# Patient Record
Sex: MALE | Race: WHITE | NOT HISPANIC OR LATINO | Employment: FULL TIME | URBAN - METROPOLITAN AREA
[De-identification: names, ages, dates, MRNs, and addresses within clinical notes are randomized per-mention and may not be internally consistent; named-entity substitution may affect disease eponyms.]

---

## 2023-01-10 ENCOUNTER — OFFICE VISIT (OUTPATIENT)
Dept: URGENT CARE | Facility: PHYSICIAN GROUP | Age: 28
End: 2023-01-10
Payer: OTHER GOVERNMENT

## 2023-01-10 VITALS
OXYGEN SATURATION: 95 % | SYSTOLIC BLOOD PRESSURE: 130 MMHG | WEIGHT: 164 LBS | DIASTOLIC BLOOD PRESSURE: 88 MMHG | HEIGHT: 71 IN | TEMPERATURE: 98.7 F | BODY MASS INDEX: 22.96 KG/M2 | HEART RATE: 89 BPM | RESPIRATION RATE: 14 BRPM

## 2023-01-10 DIAGNOSIS — R21 RASH IN ADULT: ICD-10-CM

## 2023-01-10 DIAGNOSIS — B27.90 INFECTIOUS MONONUCLEOSIS WITHOUT COMPLICATION, INFECTIOUS MONONUCLEOSIS DUE TO UNSPECIFIED ORGANISM: ICD-10-CM

## 2023-01-10 LAB
HETEROPH AB SER QL LA: POSITIVE
INT CON NEG: NEGATIVE
INT CON POS: POSITIVE

## 2023-01-10 PROCEDURE — 86308 HETEROPHILE ANTIBODY SCREEN: CPT | Performed by: PHYSICIAN ASSISTANT

## 2023-01-10 PROCEDURE — 99203 OFFICE O/P NEW LOW 30 MIN: CPT | Performed by: PHYSICIAN ASSISTANT

## 2023-01-10 ASSESSMENT — ENCOUNTER SYMPTOMS
DIAPHORESIS: 0
COUGH: 0
DIARRHEA: 0
EYE PAIN: 0
SORE THROAT: 0
CHILLS: 0
CONSTIPATION: 0
SHORTNESS OF BREATH: 0
ABDOMINAL PAIN: 0
DIZZINESS: 0
FEVER: 0
EYE DISCHARGE: 0
WHEEZING: 0
EYE REDNESS: 0
HEADACHES: 0
VOMITING: 0
SINUS PAIN: 0
NAUSEA: 0

## 2023-01-10 NOTE — PROGRESS NOTES
"  Subjective:     Fede Boles  is a 27 y.o. male who presents for Rash (Rash all over body started yesterday )       He presents today with a generalized erythematous maculopapular rash x1 day.  Notes that he is currently taking amoxicillin for dental complaint and has been taking the amoxicillin for 14-maria teresa days; he has stopped his amoxicillin once the onset of the rash did occur.  Rash is pruritic in nature, nonpainful, rash did start on the trunk and moved to the extremities.  No mucosal involvement.  No skin sloughing.  Denies fever/chills/sweats, chest pain, shortness of breath, nausea/vomiting, abdominal pain, diarrhea.  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     Review of Systems   Constitutional:  Negative for chills, diaphoresis, fever and malaise/fatigue.   HENT:  Negative for congestion, ear discharge, sinus pain and sore throat.    Eyes:  Negative for pain, discharge and redness.   Respiratory:  Negative for cough, shortness of breath and wheezing.    Cardiovascular:  Negative for chest pain.   Gastrointestinal:  Negative for abdominal pain, constipation, diarrhea, nausea and vomiting.   Skin:  Positive for itching and rash.   Neurological:  Negative for dizziness and headaches.    Not on File  History reviewed. No pertinent past medical history.     Objective:   /88 (BP Location: Left arm, Patient Position: Sitting, BP Cuff Size: Adult)   Pulse 89   Temp 37.1 °C (98.7 °F) (Temporal)   Resp 14   Ht 1.803 m (5' 11\")   Wt 74.4 kg (164 lb)   SpO2 95%   BMI 22.87 kg/m²   Physical Exam  Vitals and nursing note reviewed.   Constitutional:       General: He is not in acute distress.     Appearance: He is not ill-appearing or toxic-appearing.   HENT:      Head: Normocephalic.      Nose: No rhinorrhea.      Mouth/Throat:      Mouth: Mucous membranes are moist.      Pharynx: No oropharyngeal exudate or posterior " oropharyngeal erythema.      Comments: No oropharyngeal swelling or edema  Eyes:      General: No scleral icterus.     Conjunctiva/sclera: Conjunctivae normal.   Cardiovascular:      Rate and Rhythm: Normal rate and regular rhythm.   Pulmonary:      Effort: Pulmonary effort is normal. No respiratory distress.      Breath sounds: Normal breath sounds. No stridor.   Musculoskeletal:      Cervical back: Neck supple.   Skin:     Comments: Generalized erythematous maculopapular rash present over all aspects of the body.  No mucosal involvement.  Rash is pruritic in nature, nonpainful.   Neurological:      Mental Status: He is alert and oriented to person, place, and time.   Psychiatric:         Mood and Affect: Mood normal.         Behavior: Behavior normal.         Thought Content: Thought content normal.         Judgment: Judgment normal.           Diagnostic testing:    POC mono-positive    Assessment/Plan:     Encounter Diagnoses   Name Primary?    Infectious mononucleosis without complication, infectious mononucleosis due to unspecified organism     Rash in adult           Plan for care for today's complaint includes discontinuing the amoxicillin, patient's full-body erythematous maculopapular rash is likely an interaction between his mononucleosis infection and him taking amoxicillin for his dental pain.  Patient states his dental pain is fully resolved at this time.  No evidence of mucosal involvement.  Use Benadryl as needed for pruritic rash.  Did discuss he needs to modify activity to avoid abdominal impacts as he would be predisposed to splenic injury with this monoinfection.  Continue to monitor symptoms and return to urgent care or follow-up with primary care provider if symptoms remain ongoing.  Follow-up in the emergency department if symptoms become severe, ER precautions discussed in office today..    See AVS Instructions below for written guidance provided to patient on after-visit management and care  in addition to our verbal discussion during the visit.    Please note that this dictation was created using voice recognition software. I have attempted to correct all errors, but there may be sound-alike, spelling, grammar and possibly content errors that I did not discover before finalizing the note.    Krishna Pereyra PA-C

## 2023-01-10 NOTE — LETTER
[facility] [address] [address]     January 10, 2023    Patient: Fede Boles   YOB: 1995   Date of Visit: 1/10/2023       To Whom It May Concern:    Fede Boles was seen and treated in our department on 1/10/2023. Please excuse from work on 1/10/2023, can return on 1/11/2023 but must be able to modify work activity to avoid any direct contact to chest or abdomen, these restrictions will remain in place until 1/31/2023.    Sincerely,     Krishna Pereyra P.A.-C.

## 2023-01-13 ENCOUNTER — TELEPHONE (OUTPATIENT)
Dept: HEALTH INFORMATION MANAGEMENT | Facility: OTHER | Age: 28
End: 2023-01-13

## 2023-01-19 ENCOUNTER — OFFICE VISIT (OUTPATIENT)
Dept: MEDICAL GROUP | Facility: PHYSICIAN GROUP | Age: 28
End: 2023-01-19
Payer: OTHER GOVERNMENT

## 2023-01-19 VITALS
HEIGHT: 71 IN | HEART RATE: 89 BPM | WEIGHT: 168 LBS | TEMPERATURE: 98.1 F | RESPIRATION RATE: 16 BRPM | OXYGEN SATURATION: 95 % | BODY MASS INDEX: 23.52 KG/M2 | SYSTOLIC BLOOD PRESSURE: 118 MMHG | DIASTOLIC BLOOD PRESSURE: 58 MMHG

## 2023-01-19 DIAGNOSIS — B27.90 INFECTIOUS MONONUCLEOSIS WITHOUT COMPLICATION, INFECTIOUS MONONUCLEOSIS DUE TO UNSPECIFIED ORGANISM: ICD-10-CM

## 2023-01-19 PROCEDURE — 99213 OFFICE O/P EST LOW 20 MIN: CPT | Performed by: PHYSICIAN ASSISTANT

## 2023-01-19 ASSESSMENT — PATIENT HEALTH QUESTIONNAIRE - PHQ9: CLINICAL INTERPRETATION OF PHQ2 SCORE: 0

## 2023-01-19 NOTE — PROGRESS NOTES
"Subjective:     CC: UC follow up     HPI:   Fede presents today for UC follow up:    1/10/2023 UC visit  Dx'd with [facility]  Went to the UC for a rash  Was on amox for recent wisdom teeth removal  Was having: cough, ST, fatigue  Never had a fever  Overall feels a lot better, has been going to work with a mask  Rash has mostly resolved  Was given a note for light duty    No problems updated.    Health Maintenance: Completed    ROS:  ROS    Objective:     Exam:  /58 (BP Location: Left arm, Patient Position: Sitting, BP Cuff Size: Adult)   Pulse 89   Temp 36.7 °C (98.1 °F) (Temporal)   Resp 16   Ht 1.803 m (5' 11\")   Wt 76.2 kg (168 lb)   SpO2 95%   BMI 23.43 kg/m²  Body mass index is 23.43 kg/m².    Physical Exam      Assessment & Plan:     27 y.o. male with the following -     1. Infectious mononucleosis without complication, infectious mononucleosis due to unspecified organism  Acute, improving.  Patient needs clearance to back to work.  May return to work without restrictions, today.  Patient works as a  for the [employer] .      Return if symptoms worsen or fail to improve.    Please note that this dictation was created using voice recognition software. I have made every reasonable attempt to correct obvious errors, but I expect that there are errors of grammar and possibly content that I did not discover before finalizing the note.        "

## 2023-01-19 NOTE — LETTER
January 19, 2023    To Whom It May Concern:         This is confirmation that Fede Boles attended his scheduled appointment with Lauryn Almaguer P.A.-C. on 1/19/23. He may return to work today, without restrictions and does not need to wear a mask.         If you have any questions please do not hesitate to call me at the phone number listed below.    Sincerely,          Lauryn Almaguer P.A.-C.  [phone]

## 2024-01-04 ENCOUNTER — OFFICE VISIT (OUTPATIENT)
Dept: URGENT CARE | Facility: PHYSICIAN GROUP | Age: 29
End: 2024-01-04
Payer: OTHER GOVERNMENT

## 2024-01-04 VITALS
BODY MASS INDEX: 25 KG/M2 | HEART RATE: 69 BPM | TEMPERATURE: 97.5 F | WEIGHT: 178.57 LBS | OXYGEN SATURATION: 97 % | HEIGHT: 71 IN | RESPIRATION RATE: 16 BRPM | DIASTOLIC BLOOD PRESSURE: 66 MMHG | SYSTOLIC BLOOD PRESSURE: 118 MMHG

## 2024-01-04 DIAGNOSIS — J40 BRONCHITIS WITH ACUTE WHEEZING: ICD-10-CM

## 2024-01-04 DIAGNOSIS — J01.40 ACUTE NON-RECURRENT PANSINUSITIS: ICD-10-CM

## 2024-01-04 LAB
FLUAV RNA SPEC QL NAA+PROBE: NEGATIVE
FLUBV RNA SPEC QL NAA+PROBE: NEGATIVE
RSV RNA SPEC QL NAA+PROBE: NEGATIVE
SARS-COV-2 RNA RESP QL NAA+PROBE: NEGATIVE

## 2024-01-04 PROCEDURE — 3074F SYST BP LT 130 MM HG: CPT | Performed by: NURSE PRACTITIONER

## 2024-01-04 PROCEDURE — 3078F DIAST BP <80 MM HG: CPT | Performed by: NURSE PRACTITIONER

## 2024-01-04 PROCEDURE — 0241U POCT CEPHEID COV-2, FLU A/B, RSV - PCR: CPT | Performed by: NURSE PRACTITIONER

## 2024-01-04 PROCEDURE — 99213 OFFICE O/P EST LOW 20 MIN: CPT | Performed by: NURSE PRACTITIONER

## 2024-01-04 RX ORDER — AMOXICILLIN AND CLAVULANATE POTASSIUM 875; 125 MG/1; MG/1
1 TABLET, FILM COATED ORAL 2 TIMES DAILY
Qty: 14 TABLET | Refills: 0 | Status: SHIPPED | OUTPATIENT
Start: 2024-01-04 | End: 2024-01-11

## 2024-01-04 RX ORDER — PREDNISONE 20 MG/1
40 TABLET ORAL DAILY
Qty: 10 TABLET | Refills: 0 | Status: SHIPPED | OUTPATIENT
Start: 2024-01-04 | End: 2024-01-09

## 2024-01-04 RX ORDER — ALBUTEROL SULFATE 90 UG/1
2 AEROSOL, METERED RESPIRATORY (INHALATION) EVERY 6 HOURS PRN
Qty: 8.5 G | Refills: 0 | Status: SHIPPED | OUTPATIENT
Start: 2024-01-04 | End: 2024-01-18

## 2024-01-04 NOTE — PROGRESS NOTES
Date: 01/04/24     Chief Complaint:    Chief Complaint   Patient presents with    Cough     For about a week now has been having a cough, nose/chest congestion (yellow/green mucus), diarrhea, body aches, fever, sinus pressure, wheezing, nausea, loss of appetite         History of Present Illness: 28 y.o. male  presents 1 week history of cold-like symptoms.  He has had a cough rhinorrhea sinus congestion pain pressure fever with body aches nausea with no vomiting intermittent diarrhea and subjective wheezing.  Patient has been using over-the-counter cold medications to help manage the symptoms although states his symptoms continue to worsen.  He denies any severe shortness of breath chest pain or leg swelling.  No history of asthma COPD or smoking.    ROS:  As stated in HPI       Medical/SX/ Social History:  Reviewed per chart    Medications:    No current outpatient medications on file prior to visit.     No current facility-administered medications on file prior to visit.        Allergies:    Patient has no known allergies.     Problem list, medications, and allergies reviewed by myself today in Epic       Physical Exam:  Vitals:    01/04/24 1153   BP: 118/66   Pulse: 69   Resp: 16   Temp: 36.4 °C (97.5 °F)   SpO2: 97%        Physical Exam  Constitutional:       General: He is awake.      Appearance: Normal appearance. He is not ill-appearing, toxic-appearing or diaphoretic.   HENT:      Head: Normocephalic and atraumatic.      Right Ear: Tympanic membrane, ear canal and external ear normal.      Left Ear: Tympanic membrane, ear canal and external ear normal.      Nose: Congestion and rhinorrhea present. Rhinorrhea is clear.      Right Sinus: Maxillary sinus tenderness present.      Left Sinus: Maxillary sinus tenderness present.      Mouth/Throat:      Lips: Pink.      Mouth: Mucous membranes are moist.      Tongue: No lesions.      Palate: No lesions.      Pharynx: Posterior oropharyngeal erythema present. No  pharyngeal swelling, oropharyngeal exudate or uvula swelling.      Tonsils: No tonsillar exudate or tonsillar abscesses.   Eyes:      General: Lids are normal. Gaze aligned appropriately. No allergic shiner or scleral icterus.     Extraocular Movements: Extraocular movements intact.      Conjunctiva/sclera: Conjunctivae normal.      Pupils: Pupils are equal, round, and reactive to light.   Cardiovascular:      Rate and Rhythm: Normal rate and regular rhythm.      Pulses:           Radial pulses are 2+ on the right side and 2+ on the left side.      Heart sounds: Normal heart sounds.   Pulmonary:      Effort: Pulmonary effort is normal.      Breath sounds: Normal air entry. Examination of the right-upper field reveals wheezing. Examination of the left-upper field reveals wheezing. Wheezing present. No decreased breath sounds, rhonchi or rales.   Abdominal:      General: Abdomen is flat. Bowel sounds are normal.      Palpations: Abdomen is soft.      Tenderness: There is no abdominal tenderness.   Musculoskeletal:      Right lower leg: No edema.      Left lower leg: No edema.   Lymphadenopathy:      Cervical: No cervical adenopathy.   Skin:     General: Skin is warm.      Capillary Refill: Capillary refill takes less than 2 seconds.      Coloration: Skin is not cyanotic or pale.   Neurological:      Mental Status: He is alert and oriented to person, place, and time.      Gait: Gait is intact.   Psychiatric:         Behavior: Behavior normal. Behavior is cooperative.          Diagnostics:      Recent Results (from the past 24 hour(s))   POCT CoV-2, Flu A/B, RSV by PCR    Collection Time: 01/04/24  1:20 PM   Result Value Ref Range    SARS-CoV-2 by PCR Negative Negative, Invalid    Influenza virus A RNA Negative Negative, Invalid    Influenza virus B, PCR Negative Negative, Invalid    RSV, PCR Negative Negative, Invalid         Diagnostics interpreted by myself.      Medical Decision Making / Plan :  I personally  reviewed prior external notes and test results pertinent to today's visit. Pt is clinically stable at today's acute urgent care visit.  Patient appears nontoxic with no acute distress noted. Appropriate for outpatient care at this time. The patient remained stable during the urgent care visit.     Pleasant 28 y.o. male  presented clinic with HPI examining consistent with acute bacterial pansinusitis, second sickening syndrome secondary to viral URI.  Did send Augmentin 7-day course.  Patient also has mild expiratory wheezing on exam consistent with postviral cough/bronchitis.  Did send for 5-day burst of prednisone to decrease inflammation as well as an albuterol inhaler.  Did discuss viral testing would not change treatment course although patient did request viral testing as his wife is pregnant.  Shared decision-making was utilized with patient for treatment plan. Differential Diagnosis, natural history, and supportive care discussed. Did advise patient on conservative measures for management of symptoms.  Patient is agreeable to pursue adequate rest, adequate hydration, saltwater gargle and Neti pot for any symptoms of upper respiratory congestion.  Over-the-counter analgesia and antipyretics on a p.r.n. basis as needed for pain and fever.  Did discuss over-the-counter cough medications.      1. Acute non-recurrent pansinusitis    - POCT CoV-2, Flu A/B, RSV by PCR  - amoxicillin-clavulanate (AUGMENTIN) 875-125 MG Tab; Take 1 Tablet by mouth 2 times a day for 7 days.  Dispense: 14 Tablet; Refill: 0    2. Bronchitis with acute wheezing    - albuterol 108 (90 Base) MCG/ACT Aero Soln inhalation aerosol; Inhale 2 Puffs every 6 hours as needed for Shortness of Breath for up to 14 days.  Dispense: 8.5 g; Refill: 0  - predniSONE (DELTASONE) 20 MG Tab; Take 2 Tablets by mouth every day for 5 days.  Dispense: 10 Tablet; Refill: 0     Medication discussed included indication for use and the potential benefits and side  effects. Education was provided regarding the aforementioned assessments.  All of the patient's questions were answered to their satisfaction at the time of discharge. Patient was encouraged to monitor symptoms closely. Those signs and symptoms which would warrant concern and mandate seeking a higher level of service through the emergency department discussed at length.  Patient stated agreement and understanding of this plan of care.        Disposition:  Patient was discharged in stable condition.    Voice Recognition Disclaimer: Portions of this document were created using voice recognition software. The software does have a chance of producing errors of grammar and possibly content. I have made every reasonable attempt to correct obvious errors, but there may be errors of grammar and possibly content that I did not discover before finalizing the documentation.    RUSTAM Phan.

## 2024-04-03 ENCOUNTER — OFFICE VISIT (OUTPATIENT)
Dept: MEDICAL GROUP | Facility: PHYSICIAN GROUP | Age: 29
End: 2024-04-03
Payer: OTHER GOVERNMENT

## 2024-04-03 VITALS
BODY MASS INDEX: 26.18 KG/M2 | RESPIRATION RATE: 12 BRPM | HEIGHT: 71 IN | TEMPERATURE: 97.6 F | DIASTOLIC BLOOD PRESSURE: 74 MMHG | WEIGHT: 187 LBS | OXYGEN SATURATION: 97 % | SYSTOLIC BLOOD PRESSURE: 120 MMHG | HEART RATE: 88 BPM

## 2024-04-03 DIAGNOSIS — R42 DIZZINESS: ICD-10-CM

## 2024-04-03 PROCEDURE — 99214 OFFICE O/P EST MOD 30 MIN: CPT | Performed by: PHYSICIAN ASSISTANT

## 2024-04-03 PROCEDURE — 3078F DIAST BP <80 MM HG: CPT | Performed by: PHYSICIAN ASSISTANT

## 2024-04-03 PROCEDURE — 3074F SYST BP LT 130 MM HG: CPT | Performed by: PHYSICIAN ASSISTANT

## 2024-04-03 ASSESSMENT — PATIENT HEALTH QUESTIONNAIRE - PHQ9: CLINICAL INTERPRETATION OF PHQ2 SCORE: 0

## 2024-04-03 NOTE — PROGRESS NOTES
"Verbal consent was acquired by the patient to use [employer] ambient listening note generation during this visit     Subjective:     HPI:   History of Present Illness  The patient is a 28-year-old male here today to discuss intermittent dizziness that has happened over the last couple of years.    The patient has been experiencing intermittent dizziness for the past few years, with episodes occurring sporadically and increasing in frequency. Initially, he hypothesized that the dizziness was due to an elevation, which temporarily subsided but has since recurred since 06/2023. The frequency of these episodes is approximately once or twice a month, with the most recent episode occurring in 08/2023. The onset of these episodes is unprecedented. The patient maintains a high water intake, primarily through Body[employer]ors and one energy drink per day, four times a week, but does not believe they are contributing to his symptoms. Occasionally, he experiences tachycardia during these episodes. During his last syncope episode, he lost consciousness for approximately 30 to 45 seconds, during which he exhibited mild tremors. He does not believe the dizziness is positional and does not correlate with rapid changes in position or head rotation.    He is not aware of any family history of heart problems.    Health Maintenance: Completed    ROS:  Gen: no fevers/chills  Eyes: no changes in vision  ENT: no sore throat  Pulm: no sob, no cough  CV: no chest pain  GI: no nausea/vomiting  : no dysuria  MSk: no myalgias  Skin: no rash  Neuro: no headaches, positive for dizziness  Psych: no depression, no anxiety    Objective:     Exam:  /74   Pulse 88   Temp 36.4 °C (97.6 °F) (Temporal)   Resp 12   Ht 1.803 m (5' 11\")   Wt 84.8 kg (187 lb)   SpO2 97%   BMI 26.08 kg/m²  Body mass index is 26.08 kg/m².    PHYSICAL EXAM  Constitutional: Alert, no distress, well-groomed.  Skin: Warm, dry, good turgor, no rashes in visible " "areas.  Eye: Equal, round and reactive, conjunctiva clear, lids normal.  ENMT: Lips without lesions, good dentition, moist mucous membranes.  Neck: Trachea midline, no masses, no thyromegaly.  Respiratory: Unlabored respiratory effort, no cough.  MSK: Normal gait, moves all extremities.  Neuro: Grossly non-focal.   Psych: Alert and oriented x3, normal affect and mood.    Results      Assessment & Plan:     1. Dizziness  REFERRAL TO CARDIOLOGY          Assessment & Plan  1. Dizziness.  New diagnosis uncertain etiology.  Differential discussed with the patient.  Because the episodes of syncope do not have any obvious cause and seem to be unprovoked in addition to the palpitations she experiences during the events I recommend proceeding with further testing.  Do think tilt table would potentially appropriate but will defer decision to specialist.  A referral to cardiology has been initiated for further evaluation which patient is in agreement with.  He also states that during his last syncopal episode his wife noticed him \"shaking\" while he was unconscious.  Did discuss that seizure is also on the differential as well as BPPV, dehydration, and caffeine use however do think this is less likely given the reported history.  Can pursue further workup if necessary after evaluation by cardiology.  Patient will follow-up sooner for new or worsening symptoms.        I spent a total of 32 minutes with record review, exam, communication with the patient, communication with other providers, and documentation of this encounter.    Please note that this dictation was created using voice recognition software. I have made every reasonable attempt to correct obvious errors, but I expect that there are errors of grammar and possibly content that I did not discover before finalizing the note.        "

## 2024-08-01 ENCOUNTER — TELEPHONE (OUTPATIENT)
Dept: CARDIOLOGY | Facility: MEDICAL CENTER | Age: 29
End: 2024-08-01
Payer: OTHER GOVERNMENT

## 2024-08-01 NOTE — TELEPHONE ENCOUNTER
Spoke to patient in regards to records for NP appointment with .     Patient has seen a cardiologist before?  No    Any recent cardiac testing outside of [facility]?  No      Were any records requested?  No        Patient has no recent imaging/labs no hospitalizations.

## 2024-08-05 ENCOUNTER — HOSPITAL ENCOUNTER (OUTPATIENT)
Dept: LAB | Facility: MEDICAL CENTER | Age: 29
End: 2024-08-05
Attending: STUDENT IN AN ORGANIZED HEALTH CARE EDUCATION/TRAINING PROGRAM
Payer: OTHER GOVERNMENT

## 2024-08-05 ENCOUNTER — OFFICE VISIT (OUTPATIENT)
Dept: CARDIOLOGY | Facility: MEDICAL CENTER | Age: 29
End: 2024-08-05
Attending: PHYSICIAN ASSISTANT
Payer: OTHER GOVERNMENT

## 2024-08-05 VITALS
HEART RATE: 63 BPM | WEIGHT: 186 LBS | DIASTOLIC BLOOD PRESSURE: 68 MMHG | OXYGEN SATURATION: 100 % | SYSTOLIC BLOOD PRESSURE: 122 MMHG | BODY MASS INDEX: 26.04 KG/M2 | HEIGHT: 71 IN | RESPIRATION RATE: 18 BRPM

## 2024-08-05 DIAGNOSIS — R42 LIGHTHEADEDNESS: ICD-10-CM

## 2024-08-05 DIAGNOSIS — Z82.79 FAMILY HISTORY OF BICUSPID CARDIAC VALVE: ICD-10-CM

## 2024-08-05 DIAGNOSIS — R55 SYNCOPE AND COLLAPSE: ICD-10-CM

## 2024-08-05 DIAGNOSIS — Z86.69 HISTORY OF SEIZURES AS A CHILD: ICD-10-CM

## 2024-08-05 DIAGNOSIS — Z82.79 FAMILY HISTORY OF CONGENITAL HEART DEFECT: ICD-10-CM

## 2024-08-05 LAB
ALBUMIN SERPL BCP-MCNC: 4.5 G/DL (ref 3.2–4.9)
ALBUMIN/GLOB SERPL: 1.5 G/DL
ALP SERPL-CCNC: 69 U/L (ref 30–99)
ALT SERPL-CCNC: 27 U/L (ref 2–50)
ANION GAP SERPL CALC-SCNC: 16 MMOL/L (ref 7–16)
AST SERPL-CCNC: 25 U/L (ref 12–45)
BASOPHILS # BLD AUTO: 0.8 % (ref 0–1.8)
BASOPHILS # BLD: 0.07 K/UL (ref 0–0.12)
BILIRUB SERPL-MCNC: 0.6 MG/DL (ref 0.1–1.5)
BUN SERPL-MCNC: 11 MG/DL (ref 8–22)
CALCIUM ALBUM COR SERPL-MCNC: 9.2 MG/DL (ref 8.5–10.5)
CALCIUM SERPL-MCNC: 9.6 MG/DL (ref 8.5–10.5)
CHLORIDE SERPL-SCNC: 102 MMOL/L (ref 96–112)
CO2 SERPL-SCNC: 21 MMOL/L (ref 20–33)
CREAT SERPL-MCNC: 0.94 MG/DL (ref 0.5–1.4)
EKG IMPRESSION: NORMAL
EOSINOPHIL # BLD AUTO: 0.21 K/UL (ref 0–0.51)
EOSINOPHIL NFR BLD: 2.5 % (ref 0–6.9)
ERYTHROCYTE [DISTWIDTH] IN BLOOD BY AUTOMATED COUNT: 43 FL (ref 35.9–50)
GFR SERPLBLD CREATININE-BSD FMLA CKD-EPI: 114 ML/MIN/1.73 M 2
GLOBULIN SER CALC-MCNC: 3.1 G/DL (ref 1.9–3.5)
GLUCOSE SERPL-MCNC: 89 MG/DL (ref 65–99)
HCT VFR BLD AUTO: 44.6 % (ref 42–52)
HGB BLD-MCNC: 16 G/DL (ref 14–18)
IMM GRANULOCYTES # BLD AUTO: 0.02 K/UL (ref 0–0.11)
IMM GRANULOCYTES NFR BLD AUTO: 0.2 % (ref 0–0.9)
LYMPHOCYTES # BLD AUTO: 3.2 K/UL (ref 1–4.8)
LYMPHOCYTES NFR BLD: 38 % (ref 22–41)
MCH RBC QN AUTO: 31.2 PG (ref 27–33)
MCHC RBC AUTO-ENTMCNC: 35.9 G/DL (ref 32.3–36.5)
MCV RBC AUTO: 86.9 FL (ref 81.4–97.8)
MONOCYTES # BLD AUTO: 0.56 K/UL (ref 0–0.85)
MONOCYTES NFR BLD AUTO: 6.7 % (ref 0–13.4)
NEUTROPHILS # BLD AUTO: 4.35 K/UL (ref 1.82–7.42)
NEUTROPHILS NFR BLD: 51.8 % (ref 44–72)
NRBC # BLD AUTO: 0 K/UL
NRBC BLD-RTO: 0 /100 WBC (ref 0–0.2)
PLATELET # BLD AUTO: 311 K/UL (ref 164–446)
PMV BLD AUTO: 11.9 FL (ref 9–12.9)
POTASSIUM SERPL-SCNC: 4 MMOL/L (ref 3.6–5.5)
PROT SERPL-MCNC: 7.6 G/DL (ref 6–8.2)
RBC # BLD AUTO: 5.13 M/UL (ref 4.7–6.1)
SODIUM SERPL-SCNC: 139 MMOL/L (ref 135–145)
TSH SERPL DL<=0.005 MIU/L-ACNC: 1.13 UIU/ML (ref 0.38–5.33)
WBC # BLD AUTO: 8.4 K/UL (ref 4.8–10.8)

## 2024-08-05 PROCEDURE — 85025 COMPLETE CBC W/AUTO DIFF WBC: CPT

## 2024-08-05 PROCEDURE — 93005 ELECTROCARDIOGRAM TRACING: CPT | Performed by: STUDENT IN AN ORGANIZED HEALTH CARE EDUCATION/TRAINING PROGRAM

## 2024-08-05 PROCEDURE — 36415 COLL VENOUS BLD VENIPUNCTURE: CPT

## 2024-08-05 PROCEDURE — 99204 OFFICE O/P NEW MOD 45 MIN: CPT | Performed by: STUDENT IN AN ORGANIZED HEALTH CARE EDUCATION/TRAINING PROGRAM

## 2024-08-05 PROCEDURE — 80053 COMPREHEN METABOLIC PANEL: CPT

## 2024-08-05 PROCEDURE — 93010 ELECTROCARDIOGRAM REPORT: CPT | Performed by: STUDENT IN AN ORGANIZED HEALTH CARE EDUCATION/TRAINING PROGRAM

## 2024-08-05 PROCEDURE — 84443 ASSAY THYROID STIM HORMONE: CPT

## 2024-08-05 PROCEDURE — 3074F SYST BP LT 130 MM HG: CPT | Performed by: STUDENT IN AN ORGANIZED HEALTH CARE EDUCATION/TRAINING PROGRAM

## 2024-08-05 PROCEDURE — 3078F DIAST BP <80 MM HG: CPT | Performed by: STUDENT IN AN ORGANIZED HEALTH CARE EDUCATION/TRAINING PROGRAM

## 2024-08-05 PROCEDURE — 99211 OFF/OP EST MAY X REQ PHY/QHP: CPT | Performed by: STUDENT IN AN ORGANIZED HEALTH CARE EDUCATION/TRAINING PROGRAM

## 2024-08-05 ASSESSMENT — ENCOUNTER SYMPTOMS
COUGH: 0
IRREGULAR HEARTBEAT: 0
DIARRHEA: 0
FEVER: 0
DYSPNEA ON EXERTION: 0
NAUSEA: 0
FOCAL WEAKNESS: 0
NIGHT SWEATS: 0
ORTHOPNEA: 0
SYNCOPE: 0
WHEEZING: 0
PALPITATIONS: 0
SHORTNESS OF BREATH: 0
DIZZINESS: 0
WEAKNESS: 0
VOMITING: 0
NEAR-SYNCOPE: 0
PND: 0
ABDOMINAL PAIN: 0

## 2024-08-05 NOTE — PATIENT INSTRUCTIONS
Echo, event monitor, and tilt table testing prior to next visit  Schedule appointment with neurology

## 2024-08-05 NOTE — PROGRESS NOTES
Cardiology Initial Consultation Note    Date of note:    8/5/2024    Primary Care Provider: Lauryn Almaguer P.A.-C.  Referring Provider: Debra Vang P.A.*     Patient Name: Fede Boles     YOB: 1995  MRN:              [id number]    Chief Complaint: Syncope and lightheadedness    History of Present Illness: Mr. Fede Boles is a 28 y.o. male with history of childhood seizure, no prior cardiac history who is here for cardiac consultation for syncope and lightheadedness.    The patient presents today to discuss symptoms. The patient presents with his wife.  The patient reports that about two years ago, the patient had an episode of syncope after getting up to go to the bathroom, and he fainted for a few seconds. He was slightly confused. Another episode occurred about a year ago when he was camping in [facility]. He reports that he was drinking alcohol prior to these two episodes. Besides these episodes, he gets lightheaded a few times a month, related to stress.  He denies any chest pain or shortness of breath on exertion.  No orthopnea, PND, or leg swelling.  He reports occasional palpitations.      Of note, the patient had an infant son who passed away recently from multiple congenital cardiac conditions.      Cardiovascular Risk Factors:  1. Smoking status: Never smoker  2. Type II Diabetes Mellitus: None/not recently checked  3. Hypertension: None  4. Dyslipidemia: No/not recently checked  5. Family history of early Coronary Artery Disease in a first degree relative (Male less than 55 years of age; Female less than 65 years of age): None  6.  Obesity and/or Metabolic Syndrome: Body mass index is 25.94 kg/m².  7. Sedentary lifestyle: Not sedentary    Review of Systems   Constitutional: Negative for fever, malaise/fatigue and night sweats.   Cardiovascular:  Negative for chest pain, dyspnea on exertion, irregular heartbeat, leg swelling, near-syncope, orthopnea, palpitations, paroxysmal  "nocturnal dyspnea and syncope.   Respiratory:  Negative for cough, shortness of breath and wheezing.    Gastrointestinal:  Negative for abdominal pain, diarrhea, nausea and vomiting.   Neurological:  Negative for dizziness, focal weakness and weakness.       All other systems reviewed and are negative.       No current outpatient medications on file.     No current facility-administered medications for this visit.         No Known Allergies      Physical Exam:  Ambulatory Vitals  /68 (BP Location: Left arm, Patient Position: Sitting, BP Cuff Size: Adult)   Pulse 63   Resp 18   Ht 1.803 m (5' 11\")   Wt 84.4 kg (186 lb)   SpO2 100%    Oxygen Therapy:  Pulse Oximetry: 100 %  BP Readings from Last 4 Encounters:   08/05/24 122/68   04/03/24 120/74   01/04/24 118/66   01/19/23 118/58       Weight/BMI: Body mass index is 25.94 kg/m².  Wt Readings from Last 4 Encounters:   08/05/24 84.4 kg (186 lb)   04/03/24 84.8 kg (187 lb)   01/04/24 81 kg (178 lb 9.2 oz)   01/19/23 76.2 kg (168 lb)         General: Well appearing and in no apparent distress  Eyes: nl conjunctiva, no icteric sclera  ENT: normal external appearance of ears, nose, and throat  Neck: no visible JVP,  no carotid bruits  Lungs: normal respiratory effort, CTAB  Heart: RRR, no murmurs, no rubs or gallops,  no edema bilateral lower extremities. No LV/RV heave on cardiac palpatation. + bilateral radial pulses.  + bilateral dp pulses.   Abdomen: soft, non tender, non distended, no masses, normal bowel sounds.  No HSM.  Extremities/MSK: no clubbing, no cyanosis  Neurological: No focal sensory deficits  Psychiatric: Appropriate affect, A/O x 3, intact judgement and insight  Skin: Warm extremities      Lab Data Review:  No recent labs    Cardiac Imaging and Procedures Review:    EKG dated 8/5/2024: My personal interpretation is sinus rhythm, ST elevation probable normal early repolarization pattern    No prior echocardiogram on file      Assessment & Plan "     1. Syncope and collapse  EKG    EC-ECHOCARDIOGRAM COMPLETE W/O CONT    Cardiac Event Monitor    Tilt Table Test    Referral to Neurology    CBC WITH DIFFERENTIAL    Comp Metabolic Panel    TSH WITH REFLEX TO FT4      2. Family history of bicuspid cardiac valve  EC-ECHOCARDIOGRAM COMPLETE W/O CONT      3. History of seizures as a child  Referral to Neurology      4. Family history of congenital heart defect  EC-ECHOCARDIOGRAM COMPLETE W/O CONT      5. Lightheadedness  EC-ECHOCARDIOGRAM COMPLETE W/O CONT    Cardiac Event Monitor    Tilt Table Test    Referral to Neurology    CBC WITH DIFFERENTIAL    Comp Metabolic Panel    TSH WITH REFLEX TO FT4            Shared Medical Decision Making:    Syncope  Lightheadedness  Family history of bicuspid aortic valve  Family history of congenital heart disease  -Will obtain echocardiogram to assess LVEF and any structural abnormalities given symptoms of syncope with family history of congenital heart disease  -Will plan to obtain event monitor to assess for any arrhythmias  -Will obtain basic labs such as CBC, CMP, and TSH  -Will plan for tilt table testing    History of childhood seizure  -Will refer the patient to neurology given symptom of syncope/lightheadedness with history of childhood seizures    All of thepatient's excellent questions were answered to the best of my knowledge and to his satisfaction.  It was a pleasure seeing Mr. Fede Boles in my clinic today. Return in about 2 months (around 10/5/2024). Patient is aware to call the cardiology clinic with any questions or concerns.      Jossy Steve MD  [facility]  [facility], [facility]  [address] [address]  Phone: [phone]  Fax: [phone]

## 2024-08-07 ENCOUNTER — HOSPITAL ENCOUNTER (OUTPATIENT)
Dept: CARDIOLOGY | Facility: MEDICAL CENTER | Age: 29
End: 2024-08-07
Attending: STUDENT IN AN ORGANIZED HEALTH CARE EDUCATION/TRAINING PROGRAM
Payer: OTHER GOVERNMENT

## 2024-08-07 DIAGNOSIS — R55 SYNCOPE AND COLLAPSE: ICD-10-CM

## 2024-08-07 DIAGNOSIS — R42 LIGHTHEADEDNESS: ICD-10-CM

## 2024-08-07 DIAGNOSIS — Z82.79 FAMILY HISTORY OF BICUSPID CARDIAC VALVE: ICD-10-CM

## 2024-08-07 DIAGNOSIS — Z82.79 FAMILY HISTORY OF CONGENITAL HEART DEFECT: ICD-10-CM

## 2024-08-07 PROCEDURE — 93306 TTE W/DOPPLER COMPLETE: CPT

## 2024-08-07 PROCEDURE — 93306 TTE W/DOPPLER COMPLETE: CPT | Mod: 26 | Performed by: STUDENT IN AN ORGANIZED HEALTH CARE EDUCATION/TRAINING PROGRAM

## 2024-08-08 ENCOUNTER — HOSPITAL ENCOUNTER (OUTPATIENT)
Dept: CARDIOLOGY | Facility: MEDICAL CENTER | Age: 29
End: 2024-08-08
Attending: STUDENT IN AN ORGANIZED HEALTH CARE EDUCATION/TRAINING PROGRAM
Payer: OTHER GOVERNMENT

## 2024-08-08 DIAGNOSIS — R55 SYNCOPE AND COLLAPSE: ICD-10-CM

## 2024-08-08 DIAGNOSIS — R42 LIGHTHEADEDNESS: ICD-10-CM

## 2024-08-08 PROCEDURE — 93660 TILT TABLE EVALUATION: CPT

## 2024-08-08 NOTE — PROGRESS NOTES
Patient had PASSIVE tilt table exam today.    Patient NPO for exam, has  home.   Patient was NEGATIVE for passing out.     Consent signed.   Patient given verbal instructions/education regarding exam.   Patient had 20 mins of passive phase, followed by 5 mins of recovery.    Patient vitals:   HR    //81   Patient had self-reported symptoms, see scanned hard chart.   After recovery phase, patient states that they are ready to go home.    Patient offered water.    Patient vitals returned to baseline.    Patient given verbal discharge instructions per protocol.     Patient ambulated self to [facility], escorted by RN, met by family member to drive patient home.    Patient of Dr. Steve, who was notified via phone message regarding EKG tracings and findings.  RN placed EKG tracings and patient documents in box to be read.

## 2024-08-13 ENCOUNTER — NON-PROVIDER VISIT (OUTPATIENT)
Dept: CARDIOLOGY | Facility: MEDICAL CENTER | Age: 29
End: 2024-08-13
Attending: STUDENT IN AN ORGANIZED HEALTH CARE EDUCATION/TRAINING PROGRAM
Payer: OTHER GOVERNMENT

## 2024-08-13 DIAGNOSIS — R55 SYNCOPE AND COLLAPSE: ICD-10-CM

## 2024-08-13 DIAGNOSIS — R42 LIGHTHEADEDNESS: ICD-10-CM

## 2024-09-06 DIAGNOSIS — Z15.89 GENETIC PREDISPOSITION TO CARDIOMYOPATHY: ICD-10-CM

## 2024-09-06 DIAGNOSIS — Z82.79 FAMILY HISTORY OF CONGENITAL ANOMALIES: ICD-10-CM

## 2024-09-06 DIAGNOSIS — Z82.49 FAMILY HISTORY OF ACUTE HEART FAILURE: ICD-10-CM

## 2024-09-06 DIAGNOSIS — Z13.79 GENETIC SCREENING: ICD-10-CM

## 2024-09-06 NOTE — PROGRESS NOTES
Wife was referred to:    [facility] HEART [facility]  [id number]  [facility] [address]  Phone: [phone]    Diagnosis   Z82.79 (ICD-10-CM) - Family history of congenital anomalies   R93.1 (ICD-10-CM) - Abnormal echocardiogram   Z15.89 (ICD-10-CM) - Genetic predisposition to cardiomyopathy   Z13.79 (ICD-10-CM) - Genetic screening

## 2024-09-12 ENCOUNTER — OFFICE VISIT (OUTPATIENT)
Dept: NEUROLOGY | Facility: MEDICAL CENTER | Age: 29
End: 2024-09-12
Attending: PSYCHIATRY & NEUROLOGY
Payer: OTHER GOVERNMENT

## 2024-09-12 VITALS
HEART RATE: 72 BPM | SYSTOLIC BLOOD PRESSURE: 112 MMHG | TEMPERATURE: 96.5 F | DIASTOLIC BLOOD PRESSURE: 60 MMHG | BODY MASS INDEX: 25.86 KG/M2 | HEIGHT: 71 IN | WEIGHT: 184.75 LBS | OXYGEN SATURATION: 98 %

## 2024-09-12 DIAGNOSIS — R55 SYNCOPE AND COLLAPSE: ICD-10-CM

## 2024-09-12 DIAGNOSIS — Z86.69 HISTORY OF SEIZURE DISORDER: ICD-10-CM

## 2024-09-12 PROCEDURE — 99203 OFFICE O/P NEW LOW 30 MIN: CPT | Performed by: PSYCHIATRY & NEUROLOGY

## 2024-09-12 PROCEDURE — 99211 OFF/OP EST MAY X REQ PHY/QHP: CPT | Performed by: PSYCHIATRY & NEUROLOGY

## 2024-09-12 PROCEDURE — 3074F SYST BP LT 130 MM HG: CPT | Performed by: PSYCHIATRY & NEUROLOGY

## 2024-09-12 PROCEDURE — 3078F DIAST BP <80 MM HG: CPT | Performed by: PSYCHIATRY & NEUROLOGY

## 2024-09-12 ASSESSMENT — FIBROSIS 4 INDEX: FIB4 SCORE: 0.4

## 2024-09-12 ASSESSMENT — PATIENT HEALTH QUESTIONNAIRE - PHQ9: CLINICAL INTERPRETATION OF PHQ2 SCORE: 0

## 2024-09-12 NOTE — PROGRESS NOTES
[facility] NEUROLOGY CLINIC    Date of Service: 09/11/24    Referred by: Jossy Steve M.D.  [address] [place] [address],  [place] [id number]      Chief complain   Syncope and history of seizures.     History of present illness (HPI)   Fede Boles is a 28 y.o. male who is referred for evaluation of an episode of syncope.  In the last 3 years, he had 2 episodes, the most recent one in 8/2023. During both of them. he got out of the bed and got dizzy/lightheaded before passing out. He was drinking heavily the night prior and felt like he was very dehydrated. In both occasions, his wife was with him but apparently denied any seizure like activity. He was not confused after waking up. He did not bite his tongue or had urinary incontinence.     Of note, his son passed away in 7/2024. Born at 30 weeks with a congenital heart condition. Has been dealing with grieving since then and is talking to a therapist. He has been dealing with a lot of anxiety since then and threw up a couple of times, which made him feel dizzy but did not pass out.  He got extensive cardiac work up in 8/2024, including ECG, Echocardiogram and Tilt-table testing, all of which were normal except for the latter which may have shown evidence of POTS, but he said it was because the straps around his legs were too tight and his knees were locked. After they released the straps, his heart rate went back to normal (although this is not mentioned in the report).    He is in the  and his job demands standing up. He has no medical history and has been in good health otherwise.    Regarding a history of seizures: He had one episode when he was a child (around age 7-8) in the context of an ear infection. He was testing different football helmets and suddenly passed out. He went to the ER but was discharged the same day. No family history of seizures. He has never been on seizure medications.    Review of Systems (ROS)  Negative for: Fever, chills,  headache, weakness, sensory changes, visual/hearing changes, chest pain, shortness of breath, cough, diarrhea, constipation, urinary frequency, dysuria, skin rash, swelling.  Positive for: Stress, anxiety.      Medical history  No    Surgical history  [place] teeth removal 2 years ago.      Relevant family history  No family history of cardiac or neurological problems that he is aware of.      Social history  Social History     Tobacco Use    Smoking status: Never    Smokeless tobacco: Never   Substance Use Topics    Alcohol use: Yes     Comment: socially - beer         Medications    None      Physical exam    Vitals:    09/12/24 0749   BP: 112/60   Pulse: 72   Temp: 35.8 °C (96.5 °F)   SpO2: 98%         General: No acute distress  Heart: Regular rate and rhythm  Lungs: Clear to auscultation  Abdomen: Soft and non-tender  Extremities: No swelling  Skin: No rash or other lesion on visible skin      Neurologic exam:  Mental status: Alert and oriented x4   Cranial nerves: Intact  Cerebellar: Normal finger to nose, no tremors   Motor: Normal throughout  Sensory: Intact to light touch  Reflexes: 2+  Gait: Normal      Laboratory results  Lab Results   Component Value Date/Time    WBC 8.4 08/05/2024 03:26 PM    RBC 5.13 08/05/2024 03:26 PM    HEMOGLOBIN 16.0 08/05/2024 03:26 PM    HEMATOCRIT 44.6 08/05/2024 03:26 PM    MCV 86.9 08/05/2024 03:26 PM    MCH 31.2 08/05/2024 03:26 PM    MCHC 35.9 08/05/2024 03:26 PM    MPV 11.9 08/05/2024 03:26 PM    NEUTSPOLYS 51.80 08/05/2024 03:26 PM    LYMPHOCYTES 38.00 08/05/2024 03:26 PM    MONOCYTES 6.70 08/05/2024 03:26 PM    EOSINOPHILS 2.50 08/05/2024 03:26 PM    BASOPHILS 0.80 08/05/2024 03:26 PM          Lab Results   Component Value Date/Time    SODIUM 139 08/05/2024 03:26 PM    POTASSIUM 4.0 08/05/2024 03:26 PM    CHLORIDE 102 08/05/2024 03:26 PM    CO2 21 08/05/2024 03:26 PM    GLUCOSE 89 08/05/2024 03:26 PM    BUN 11 08/05/2024 03:26 PM    CREATININE 0.94 08/05/2024 03:26 PM             Imaging results    8/5/2024  ECG: Interpretive Statements   Sinus rhythm   ST elev, probable normal early repol pattern   No previous ECG available for comparison       8/7/2024  Echocardiogram  CONCLUSIONS  Normal left ventricular systolic function. The left ventricular   ejection fraction is visually estimated to be 65%.   Normal right ventricular size and systolic function.  No significant valvular abnormalities.   No prior study is available for comparison.    8/20/2024  Tilt table testing    FINDINGS:     Supine >10 minutes:   -140/70s mmHg.   HR 67-90 bpm.   Sinus rhythm.     Tilt to 70 degrees for 10 minutes:   BP range 140-150/80-90 mmHg.   HR range 100-114 bpm.   Sinus rhythm.     Recovery blood pressure: 130-150/70-90s mmHg.   Recovery HR 79 bpm.     The patient denied symptoms     Sinus rhythm throughout the procedure, no rhythm change.      The test was terminated.     IMPRESSION:  Results are suggestive of POTS (Postural Orthostatic Tachycardia Syndrome).     Impression  Fede comes to my office for evaluation of two episodes of syncope in the context of a remote history of seizures, which may have been secondary to an ear infection when he was a child. He had extensive cardiac work up, which came back normal except for asymptomatic heart rate increase suggestive of POTS. Although this syndrome could cause episodes of syncope, it sounds like these two events occured in the context of dehydration and orthostatism after binge drinking.   His neurological exam today is completely normal, and he has not had more of these episodes in over a year.  No further work-up is needed at the moment. I advised him to avoid heavy alcohol intake and keep himself hydrated. He will call the office if he experiences another episode or if he has further questions or concerns      Return to the clinic as needed.      Approximately 30 minutes were dedicated to this encounter for direct patient care, which did  not include chart reviewing, placing orders, and completing documentation.       Dickson Robertson MD  Neurology/Neuro-Oncology  [facility]

## 2024-10-04 DIAGNOSIS — Z82.49 FAMILY HISTORY OF ACUTE HEART FAILURE: ICD-10-CM

## 2024-10-04 DIAGNOSIS — Z15.89 GENETIC PREDISPOSITION TO CARDIOMYOPATHY: ICD-10-CM

## 2024-10-04 DIAGNOSIS — Z82.79 FAMILY HISTORY OF CONGENITAL ANOMALIES: ICD-10-CM

## 2024-10-11 ENCOUNTER — TELEPHONE (OUTPATIENT)
Dept: CARDIOLOGY | Facility: MEDICAL CENTER | Age: 29
End: 2024-10-11
Payer: OTHER GOVERNMENT

## 2024-10-17 ENCOUNTER — OFFICE VISIT (OUTPATIENT)
Dept: CARDIOLOGY | Facility: MEDICAL CENTER | Age: 29
End: 2024-10-17
Attending: STUDENT IN AN ORGANIZED HEALTH CARE EDUCATION/TRAINING PROGRAM
Payer: OTHER GOVERNMENT

## 2024-10-17 VITALS
RESPIRATION RATE: 16 BRPM | HEIGHT: 71 IN | WEIGHT: 192.4 LBS | OXYGEN SATURATION: 99 % | HEART RATE: 98 BPM | DIASTOLIC BLOOD PRESSURE: 62 MMHG | SYSTOLIC BLOOD PRESSURE: 122 MMHG | BODY MASS INDEX: 26.94 KG/M2

## 2024-10-17 DIAGNOSIS — Z82.79 FAMILY HISTORY OF CONGENITAL HEART DEFECT: ICD-10-CM

## 2024-10-17 DIAGNOSIS — R42 LIGHTHEADEDNESS: ICD-10-CM

## 2024-10-17 DIAGNOSIS — R55 SYNCOPE AND COLLAPSE: ICD-10-CM

## 2024-10-17 DIAGNOSIS — Z86.69 HISTORY OF SEIZURES AS A CHILD: ICD-10-CM

## 2024-10-17 DIAGNOSIS — Z82.79 FAMILY HISTORY OF BICUSPID CARDIAC VALVE: ICD-10-CM

## 2024-10-17 PROCEDURE — 99214 OFFICE O/P EST MOD 30 MIN: CPT | Performed by: STUDENT IN AN ORGANIZED HEALTH CARE EDUCATION/TRAINING PROGRAM

## 2024-10-17 PROCEDURE — 3074F SYST BP LT 130 MM HG: CPT | Performed by: STUDENT IN AN ORGANIZED HEALTH CARE EDUCATION/TRAINING PROGRAM

## 2024-10-17 PROCEDURE — 99211 OFF/OP EST MAY X REQ PHY/QHP: CPT | Performed by: STUDENT IN AN ORGANIZED HEALTH CARE EDUCATION/TRAINING PROGRAM

## 2024-10-17 PROCEDURE — 3078F DIAST BP <80 MM HG: CPT | Performed by: STUDENT IN AN ORGANIZED HEALTH CARE EDUCATION/TRAINING PROGRAM

## 2024-10-17 ASSESSMENT — ENCOUNTER SYMPTOMS
NIGHT SWEATS: 0
FOCAL WEAKNESS: 0
ABDOMINAL PAIN: 0
SYNCOPE: 0
DIZZINESS: 0
ORTHOPNEA: 0
DYSPNEA ON EXERTION: 0
COUGH: 0
WHEEZING: 0
NAUSEA: 0
WEAKNESS: 0
PND: 0
FEVER: 0
VOMITING: 0
PALPITATIONS: 0
DIARRHEA: 0
NEAR-SYNCOPE: 0
SHORTNESS OF BREATH: 0
IRREGULAR HEARTBEAT: 0

## 2024-10-17 ASSESSMENT — FIBROSIS 4 INDEX: FIB4 SCORE: 0.4

## 2024-10-22 ENCOUNTER — APPOINTMENT (OUTPATIENT)
Dept: NEUROLOGY | Facility: MEDICAL CENTER | Age: 29
End: 2024-10-22
Attending: PSYCHIATRY & NEUROLOGY
Payer: OTHER GOVERNMENT